# Patient Record
Sex: FEMALE | Race: BLACK OR AFRICAN AMERICAN | NOT HISPANIC OR LATINO | Employment: FULL TIME | URBAN - METROPOLITAN AREA
[De-identification: names, ages, dates, MRNs, and addresses within clinical notes are randomized per-mention and may not be internally consistent; named-entity substitution may affect disease eponyms.]

---

## 2017-03-18 ENCOUNTER — NURSE TRIAGE (OUTPATIENT)
Dept: ADMINISTRATIVE | Facility: CLINIC | Age: 47
End: 2017-03-18

## 2017-03-18 NOTE — TELEPHONE ENCOUNTER
"    Reason for Disposition   [1] SEVERE mouth pain (e.g., excruciating) AND [2] not improved after 2 hours of pain medicine    Answer Assessment - Initial Assessment Questions  1. ONSET: "When did the mouth start hurting?" (e.g., hours or days ago)       3/16/17  2. SEVERITY: "How bad is the pain?" (Scale 1-10; mild, moderate or severe)    - MILD (1-3):  doesn't interfere with eating or normal activities    - MODERATE (4-7): interferes with eating some solids and normal activities    - SEVERE (8-10):  excruciating pain, interferes with most normal activities    - SEVERE DYSPHAGIA: can't swallow liquids, drooling      severe  3. SORES: "Are there any sores or ulcers in the mouth?" If so, ask: "What part of the mouth are the sores in?"      Incision sites are swollen  4. FEVER: "Do you have a fever?" If so, ask: "What is your temperature, how was it measured, and when did it start?"      no  5. CAUSE: "What do you think is causing the mouth pain?"      "the strings in my mouth are bothering me."  6. OTHER SYMPTOMS: "Do you have any other symptoms?" (e.g., difficulty breathing)    Protocols used: ST MOUTH PAIN-A-    Patient had dental surgery on 3/16/17. She called with the complaint that the "strings" in her mouth are bothering her and her mouth has swelling in the inside. Patient states she is doing the rinses and taking pain medication as instructed. Patient advised to go to ED for evaluation.   "

## 2017-04-20 PROBLEM — K02.9 DENTAL CARIES: Status: ACTIVE | Noted: 2017-04-20

## 2019-12-28 PROBLEM — J02.8 ACUTE BACTERIAL PHARYNGITIS: Status: ACTIVE | Noted: 2019-12-28

## 2019-12-28 PROBLEM — B96.89 ACUTE BACTERIAL PHARYNGITIS: Status: ACTIVE | Noted: 2019-12-28

## 2019-12-28 PROBLEM — B37.31 VULVOVAGINAL CANDIDIASIS: Status: ACTIVE | Noted: 2019-12-28

## 2021-03-09 PROBLEM — E11.29 TYPE 2 DIABETES MELLITUS WITH MICROALBUMINURIA, WITH LONG-TERM CURRENT USE OF INSULIN: Status: ACTIVE | Noted: 2021-03-09

## 2021-03-09 PROBLEM — R80.9 TYPE 2 DIABETES MELLITUS WITH MICROALBUMINURIA, WITH LONG-TERM CURRENT USE OF INSULIN: Status: ACTIVE | Noted: 2021-03-09

## 2021-03-09 PROBLEM — Z79.4 TYPE 2 DIABETES MELLITUS WITH MICROALBUMINURIA, WITH LONG-TERM CURRENT USE OF INSULIN: Status: ACTIVE | Noted: 2021-03-09

## 2021-03-09 PROBLEM — E78.00 PURE HYPERCHOLESTEROLEMIA: Status: ACTIVE | Noted: 2021-03-09

## 2021-08-27 ENCOUNTER — PATIENT OUTREACH (OUTPATIENT)
Dept: ADMINISTRATIVE | Facility: HOSPITAL | Age: 51
End: 2021-08-27

## 2021-08-27 DIAGNOSIS — Z12.31 ENCOUNTER FOR SCREENING MAMMOGRAM FOR BREAST CANCER: Primary | ICD-10-CM

## 2022-03-04 DIAGNOSIS — Z12.11 COLON CANCER SCREENING: ICD-10-CM

## 2022-04-04 ENCOUNTER — PATIENT MESSAGE (OUTPATIENT)
Dept: ADMINISTRATIVE | Facility: HOSPITAL | Age: 52
End: 2022-04-04
Payer: MEDICARE

## 2022-05-02 ENCOUNTER — PATIENT MESSAGE (OUTPATIENT)
Dept: SMOKING CESSATION | Facility: CLINIC | Age: 52
End: 2022-05-02
Payer: MEDICARE

## 2022-05-09 ENCOUNTER — PATIENT OUTREACH (OUTPATIENT)
Dept: ADMINISTRATIVE | Facility: HOSPITAL | Age: 52
End: 2022-05-09
Payer: MEDICARE

## 2022-07-11 ENCOUNTER — PATIENT MESSAGE (OUTPATIENT)
Dept: ADMINISTRATIVE | Facility: HOSPITAL | Age: 52
End: 2022-07-11
Payer: MEDICARE

## 2022-07-15 ENCOUNTER — PATIENT OUTREACH (OUTPATIENT)
Dept: ADMINISTRATIVE | Facility: HOSPITAL | Age: 52
End: 2022-07-15
Payer: MEDICARE

## 2022-09-14 DIAGNOSIS — E11.9 TYPE 2 DIABETES MELLITUS WITHOUT COMPLICATION: ICD-10-CM

## 2022-09-20 ENCOUNTER — PATIENT OUTREACH (OUTPATIENT)
Dept: ADMINISTRATIVE | Facility: HOSPITAL | Age: 52
End: 2022-09-20
Payer: MEDICARE

## 2022-10-03 ENCOUNTER — PATIENT MESSAGE (OUTPATIENT)
Dept: ADMINISTRATIVE | Facility: HOSPITAL | Age: 52
End: 2022-10-03

## 2022-10-29 DIAGNOSIS — U07.1 COVID-19 VIRUS DETECTED: ICD-10-CM

## 2022-11-01 ENCOUNTER — NURSE TRIAGE (OUTPATIENT)
Dept: ADMINISTRATIVE | Facility: CLINIC | Age: 52
End: 2022-11-01

## 2022-11-01 NOTE — TELEPHONE ENCOUNTER
Pt escalated to [facility] queue in response to [facility] text regarding new or worsening symptoms that they need to speak with the Nurse about.  NOC unable to contact pt x 2 attempts.  LVM x 2 on unidentified VM; # verified.  No contact call.    Reason for Disposition   Message left on unidentified voice mail. Phone number verified.    Additional Information   Negative: Caller has already spoken with the PCP (or office), and has no further questions   Negative: Caller has already spoken with another triager and has no further questions   Negative: Caller has already spoken with another triager or PCP (or office), and has further questions and triager able to answer questions.   Negative: Busy signal.  First attempt to contact caller.  Follow-up call scheduled within 15 minutes.   Negative: No answer.  First attempt to contact caller.  Follow-up call scheduled within 15 minutes.   Negative: Message left on identified voicemail    Protocols used: No Contact or Duplicate Contact Call-A-[place]

## 2022-12-08 ENCOUNTER — PATIENT OUTREACH (OUTPATIENT)
Dept: ADMINISTRATIVE | Facility: HOSPITAL | Age: 52
End: 2022-12-08

## 2022-12-08 NOTE — PROGRESS NOTES
Contacted pt in reference to missed Dm eye exam. Pt declined to reschedule. She is schedule with a  doctor on [address] but doesn't know the name. Ask pt to get a copy of eye exam or has it fax to pcp. Pt voiced understanding.

## 2023-01-09 ENCOUNTER — PATIENT MESSAGE (OUTPATIENT)
Dept: ADMINISTRATIVE | Facility: HOSPITAL | Age: 53
End: 2023-01-09
Payer: MEDICARE

## 2023-01-19 ENCOUNTER — PATIENT OUTREACH (OUTPATIENT)
Dept: ADMINISTRATIVE | Facility: HOSPITAL | Age: 53
End: 2023-01-19
Payer: MEDICARE

## 2023-04-03 ENCOUNTER — PATIENT MESSAGE (OUTPATIENT)
Dept: ADMINISTRATIVE | Facility: HOSPITAL | Age: 53
End: 2023-04-03
Payer: MEDICARE

## 2023-04-14 ENCOUNTER — PATIENT OUTREACH (OUTPATIENT)
Dept: ADMINISTRATIVE | Facility: HOSPITAL | Age: 53
End: 2023-04-14
Payer: MEDICARE

## 2023-06-12 ENCOUNTER — PATIENT OUTREACH (OUTPATIENT)
Dept: ADMINISTRATIVE | Facility: HOSPITAL | Age: 53
End: 2023-06-12
Payer: MEDICARE

## 2023-06-12 DIAGNOSIS — E11.9 TYPE 2 DIABETES MELLITUS WITHOUT COMPLICATION, WITH LONG-TERM CURRENT USE OF INSULIN: Primary | ICD-10-CM

## 2023-06-12 DIAGNOSIS — Z79.4 TYPE 2 DIABETES MELLITUS WITHOUT COMPLICATION, WITH LONG-TERM CURRENT USE OF INSULIN: Primary | ICD-10-CM

## 2023-07-06 PROBLEM — E11.3393 MODERATE NONPROLIFERATIVE DIABETIC RETINOPATHY OF BOTH EYES ASSOCIATED WITH TYPE 2 DIABETES MELLITUS: Status: ACTIVE | Noted: 2023-07-06

## 2023-10-18 DIAGNOSIS — E11.9 TYPE 2 DIABETES MELLITUS WITHOUT COMPLICATION: ICD-10-CM

## 2024-03-11 ENCOUNTER — PATIENT OUTREACH (OUTPATIENT)
Dept: ADMINISTRATIVE | Facility: HOSPITAL | Age: 54
End: 2024-03-11

## 2024-10-09 DIAGNOSIS — E11.9 TYPE 2 DIABETES MELLITUS WITHOUT COMPLICATION: ICD-10-CM

## 2024-10-16 ENCOUNTER — PATIENT OUTREACH (OUTPATIENT)
Dept: ADMINISTRATIVE | Facility: HOSPITAL | Age: 54
End: 2024-10-16
Payer: MEDICAID

## 2024-11-14 PROBLEM — S82.892A CLOSED LEFT ANKLE FRACTURE: Status: ACTIVE | Noted: 2024-11-14

## 2024-12-09 PROBLEM — E88.82: Status: ACTIVE | Noted: 2024-12-09

## 2024-12-09 PROBLEM — E66.812: Status: ACTIVE | Noted: 2024-12-09

## 2024-12-09 PROBLEM — Z79.4 TYPE 2 DIABETES MELLITUS WITH HYPOGLYCEMIA WITHOUT COMA, WITH LONG-TERM CURRENT USE OF INSULIN: Status: ACTIVE | Noted: 2024-12-09

## 2024-12-09 PROBLEM — E11.649 TYPE 2 DIABETES MELLITUS WITH HYPOGLYCEMIA WITHOUT COMA, WITH LONG-TERM CURRENT USE OF INSULIN: Status: ACTIVE | Noted: 2024-12-09

## 2024-12-09 PROBLEM — Z15.2: Status: ACTIVE | Noted: 2024-12-09

## 2024-12-23 ENCOUNTER — PATIENT MESSAGE (OUTPATIENT)
Dept: ADMINISTRATIVE | Facility: HOSPITAL | Age: 54
End: 2024-12-23
Payer: MEDICAID

## 2025-03-31 ENCOUNTER — PATIENT MESSAGE (OUTPATIENT)
Dept: ADMINISTRATIVE | Facility: HOSPITAL | Age: 55
End: 2025-03-31
Payer: MEDICAID

## 2025-04-30 DIAGNOSIS — E11.9 TYPE 2 DIABETES MELLITUS WITHOUT COMPLICATION: ICD-10-CM

## 2025-05-12 ENCOUNTER — PATIENT MESSAGE (OUTPATIENT)
Dept: ADMINISTRATIVE | Facility: HOSPITAL | Age: 55
End: 2025-05-12
Payer: MEDICAID